# Patient Record
Sex: FEMALE | Race: BLACK OR AFRICAN AMERICAN | NOT HISPANIC OR LATINO | URBAN - METROPOLITAN AREA
[De-identification: names, ages, dates, MRNs, and addresses within clinical notes are randomized per-mention and may not be internally consistent; named-entity substitution may affect disease eponyms.]

---

## 2019-10-06 ENCOUNTER — OFFICE VISIT (OUTPATIENT)
Dept: URGENT CARE | Age: 19
End: 2019-10-06

## 2019-10-06 VITALS
DIASTOLIC BLOOD PRESSURE: 76 MMHG | WEIGHT: 144.29 LBS | RESPIRATION RATE: 18 BRPM | SYSTOLIC BLOOD PRESSURE: 113 MMHG | TEMPERATURE: 99.2 F | HEART RATE: 60 BPM | OXYGEN SATURATION: 95 %

## 2019-10-06 DIAGNOSIS — K08.9 DENTAL DISORDER: Primary | ICD-10-CM

## 2019-10-06 PROCEDURE — 99202 OFFICE O/P NEW SF 15 MIN: CPT | Performed by: PHYSICIAN ASSISTANT

## 2019-10-06 ASSESSMENT — ENCOUNTER SYMPTOMS: CONSTITUTIONAL NEGATIVE: 1

## 2022-08-29 ENCOUNTER — RX ONLY (RX ONLY)
Age: 22
End: 2022-08-29

## 2022-08-29 RX ORDER — TRETIONIN 0.25 MG/G
CREAM TOPICAL
Qty: 45 | Refills: 0 | Status: ERX | COMMUNITY
Start: 2022-08-29

## 2023-03-03 RX ORDER — SPIRONOLACTONE 50 MG/1
200 TABLET, FILM COATED ORAL DAILY
COMMUNITY

## 2023-03-11 ENCOUNTER — LAB ENCOUNTER (OUTPATIENT)
Dept: LAB | Age: 23
End: 2023-03-11
Attending: SURGERY
Payer: COMMERCIAL

## 2023-03-11 DIAGNOSIS — E05.10 GOITER, TOXIC, UNINODULAR: ICD-10-CM

## 2023-03-12 LAB — SARS-COV-2 RNA RESP QL NAA+PROBE: DETECTED

## 2023-05-23 ENCOUNTER — ANESTHESIA (OUTPATIENT)
Dept: SURGERY | Facility: HOSPITAL | Age: 23
End: 2023-05-23
Payer: COMMERCIAL

## 2023-05-23 ENCOUNTER — ANESTHESIA EVENT (OUTPATIENT)
Dept: SURGERY | Facility: HOSPITAL | Age: 23
End: 2023-05-23
Payer: COMMERCIAL

## 2023-05-23 ENCOUNTER — HOSPITAL ENCOUNTER (OUTPATIENT)
Facility: HOSPITAL | Age: 23
Setting detail: HOSPITAL OUTPATIENT SURGERY
Discharge: HOME | End: 2023-05-23
Attending: SURGERY | Admitting: SURGERY
Payer: COMMERCIAL

## 2023-05-23 VITALS
TEMPERATURE: 98 F | HEIGHT: 67 IN | RESPIRATION RATE: 16 BRPM | WEIGHT: 150 LBS | HEART RATE: 56 BPM | SYSTOLIC BLOOD PRESSURE: 112 MMHG | DIASTOLIC BLOOD PRESSURE: 75 MMHG | BODY MASS INDEX: 23.54 KG/M2 | OXYGEN SATURATION: 98 %

## 2023-05-23 DIAGNOSIS — E05.10 GOITER, TOXIC, UNINODULAR: Primary | ICD-10-CM

## 2023-05-23 DIAGNOSIS — E04.1 NON-TOXIC UNINODULAR GOITER: ICD-10-CM

## 2023-05-23 LAB — B-HCG UR QL: NEGATIVE

## 2023-05-23 PROCEDURE — 88307 TISSUE EXAM BY PATHOLOGIST: CPT | Performed by: SURGERY

## 2023-05-23 PROCEDURE — 88333 PATH CONSLTJ SURG CYTO XM 1: CPT | Performed by: SURGERY

## 2023-05-23 PROCEDURE — 81025 URINE PREGNANCY TEST: CPT

## 2023-05-23 PROCEDURE — 88331 PATH CONSLTJ SURG 1 BLK 1SPC: CPT | Performed by: SURGERY

## 2023-05-23 PROCEDURE — 0GTG0ZZ RESECTION OF LEFT THYROID GLAND LOBE, OPEN APPROACH: ICD-10-PCS | Performed by: SURGERY

## 2023-05-23 RX ORDER — PROCHLORPERAZINE EDISYLATE 5 MG/ML
5 INJECTION INTRAMUSCULAR; INTRAVENOUS EVERY 8 HOURS PRN
Status: DISCONTINUED | OUTPATIENT
Start: 2023-05-23 | End: 2023-05-23

## 2023-05-23 RX ORDER — SODIUM CHLORIDE, SODIUM LACTATE, POTASSIUM CHLORIDE, CALCIUM CHLORIDE 600; 310; 30; 20 MG/100ML; MG/100ML; MG/100ML; MG/100ML
INJECTION, SOLUTION INTRAVENOUS CONTINUOUS
Status: DISCONTINUED | OUTPATIENT
Start: 2023-05-23 | End: 2023-05-23

## 2023-05-23 RX ORDER — NEOSTIGMINE METHYLSULFATE 1 MG/ML
INJECTION, SOLUTION INTRAVENOUS AS NEEDED
Status: DISCONTINUED | OUTPATIENT
Start: 2023-05-23 | End: 2023-05-23 | Stop reason: SURG

## 2023-05-23 RX ORDER — HYDROMORPHONE HYDROCHLORIDE 1 MG/ML
0.6 INJECTION, SOLUTION INTRAMUSCULAR; INTRAVENOUS; SUBCUTANEOUS EVERY 5 MIN PRN
Status: DISCONTINUED | OUTPATIENT
Start: 2023-05-23 | End: 2023-05-23

## 2023-05-23 RX ORDER — MIDAZOLAM HYDROCHLORIDE 1 MG/ML
INJECTION INTRAMUSCULAR; INTRAVENOUS AS NEEDED
Status: DISCONTINUED | OUTPATIENT
Start: 2023-05-23 | End: 2023-05-23 | Stop reason: SURG

## 2023-05-23 RX ORDER — ONDANSETRON 2 MG/ML
INJECTION INTRAMUSCULAR; INTRAVENOUS AS NEEDED
Status: DISCONTINUED | OUTPATIENT
Start: 2023-05-23 | End: 2023-05-23 | Stop reason: SURG

## 2023-05-23 RX ORDER — PHENYLEPHRINE HCL 10 MG/ML
VIAL (ML) INJECTION AS NEEDED
Status: DISCONTINUED | OUTPATIENT
Start: 2023-05-23 | End: 2023-05-23 | Stop reason: SURG

## 2023-05-23 RX ORDER — NALOXONE HYDROCHLORIDE 0.4 MG/ML
80 INJECTION, SOLUTION INTRAMUSCULAR; INTRAVENOUS; SUBCUTANEOUS AS NEEDED
Status: DISCONTINUED | OUTPATIENT
Start: 2023-05-23 | End: 2023-05-23

## 2023-05-23 RX ORDER — ROCURONIUM BROMIDE 10 MG/ML
INJECTION, SOLUTION INTRAVENOUS AS NEEDED
Status: DISCONTINUED | OUTPATIENT
Start: 2023-05-23 | End: 2023-05-23 | Stop reason: SURG

## 2023-05-23 RX ORDER — HYDROMORPHONE HYDROCHLORIDE 1 MG/ML
0.4 INJECTION, SOLUTION INTRAMUSCULAR; INTRAVENOUS; SUBCUTANEOUS EVERY 5 MIN PRN
Status: DISCONTINUED | OUTPATIENT
Start: 2023-05-23 | End: 2023-05-23

## 2023-05-23 RX ORDER — LABETALOL HYDROCHLORIDE 5 MG/ML
5 INJECTION, SOLUTION INTRAVENOUS EVERY 5 MIN PRN
Status: DISCONTINUED | OUTPATIENT
Start: 2023-05-23 | End: 2023-05-23

## 2023-05-23 RX ORDER — TRIAMCINOLONE ACETONIDE 40 MG/ML
INJECTION, SUSPENSION INTRA-ARTICULAR; INTRAMUSCULAR AS NEEDED
Status: DISCONTINUED | OUTPATIENT
Start: 2023-05-23 | End: 2023-05-23 | Stop reason: HOSPADM

## 2023-05-23 RX ORDER — ACETAMINOPHEN 500 MG
1000 TABLET ORAL ONCE AS NEEDED
Status: COMPLETED | OUTPATIENT
Start: 2023-05-23 | End: 2023-05-23

## 2023-05-23 RX ORDER — SCOLOPAMINE TRANSDERMAL SYSTEM 1 MG/1
1 PATCH, EXTENDED RELEASE TRANSDERMAL ONCE
Status: DISCONTINUED | OUTPATIENT
Start: 2023-05-23 | End: 2023-05-23 | Stop reason: HOSPADM

## 2023-05-23 RX ORDER — DEXAMETHASONE SODIUM PHOSPHATE 4 MG/ML
VIAL (ML) INJECTION AS NEEDED
Status: DISCONTINUED | OUTPATIENT
Start: 2023-05-23 | End: 2023-05-23 | Stop reason: SURG

## 2023-05-23 RX ORDER — HYDROCODONE BITARTRATE AND ACETAMINOPHEN 5; 325 MG/1; MG/1
1 TABLET ORAL ONCE AS NEEDED
Status: COMPLETED | OUTPATIENT
Start: 2023-05-23 | End: 2023-05-23

## 2023-05-23 RX ORDER — BUPIVACAINE HYDROCHLORIDE 5 MG/ML
INJECTION, SOLUTION EPIDURAL; INTRACAUDAL AS NEEDED
Status: DISCONTINUED | OUTPATIENT
Start: 2023-05-23 | End: 2023-05-23 | Stop reason: HOSPADM

## 2023-05-23 RX ORDER — GLYCOPYRROLATE 0.2 MG/ML
INJECTION, SOLUTION INTRAMUSCULAR; INTRAVENOUS AS NEEDED
Status: DISCONTINUED | OUTPATIENT
Start: 2023-05-23 | End: 2023-05-23 | Stop reason: SURG

## 2023-05-23 RX ORDER — HYDROCODONE BITARTRATE AND ACETAMINOPHEN 5; 325 MG/1; MG/1
2 TABLET ORAL ONCE AS NEEDED
Status: COMPLETED | OUTPATIENT
Start: 2023-05-23 | End: 2023-05-23

## 2023-05-23 RX ORDER — MEPERIDINE HYDROCHLORIDE 25 MG/ML
12.5 INJECTION INTRAMUSCULAR; INTRAVENOUS; SUBCUTANEOUS AS NEEDED
Status: DISCONTINUED | OUTPATIENT
Start: 2023-05-23 | End: 2023-05-23

## 2023-05-23 RX ORDER — ONDANSETRON 2 MG/ML
4 INJECTION INTRAMUSCULAR; INTRAVENOUS EVERY 6 HOURS PRN
Status: DISCONTINUED | OUTPATIENT
Start: 2023-05-23 | End: 2023-05-23

## 2023-05-23 RX ORDER — HYDROMORPHONE HYDROCHLORIDE 1 MG/ML
0.2 INJECTION, SOLUTION INTRAMUSCULAR; INTRAVENOUS; SUBCUTANEOUS EVERY 5 MIN PRN
Status: DISCONTINUED | OUTPATIENT
Start: 2023-05-23 | End: 2023-05-23

## 2023-05-23 RX ORDER — ACETAMINOPHEN 500 MG
1000 TABLET ORAL ONCE
Status: DISCONTINUED | OUTPATIENT
Start: 2023-05-23 | End: 2023-05-23 | Stop reason: HOSPADM

## 2023-05-23 RX ORDER — METOCLOPRAMIDE HYDROCHLORIDE 5 MG/ML
INJECTION INTRAMUSCULAR; INTRAVENOUS AS NEEDED
Status: DISCONTINUED | OUTPATIENT
Start: 2023-05-23 | End: 2023-05-23 | Stop reason: SURG

## 2023-05-23 RX ORDER — IBUPROFEN 800 MG/1
800 TABLET ORAL EVERY 8 HOURS PRN
Qty: 20 TABLET | Refills: 1 | Status: SHIPPED | OUTPATIENT
Start: 2023-05-23 | End: 2023-07-22

## 2023-05-23 RX ADMIN — ROCURONIUM BROMIDE 50 MG: 10 INJECTION, SOLUTION INTRAVENOUS at 10:55:00

## 2023-05-23 RX ADMIN — ONDANSETRON 4 MG: 2 INJECTION INTRAMUSCULAR; INTRAVENOUS at 10:55:00

## 2023-05-23 RX ADMIN — GLYCOPYRROLATE 0.2 MG: 0.2 INJECTION, SOLUTION INTRAMUSCULAR; INTRAVENOUS at 10:55:00

## 2023-05-23 RX ADMIN — PHENYLEPHRINE HCL 100 MCG: 10 MG/ML VIAL (ML) INJECTION at 11:23:00

## 2023-05-23 RX ADMIN — METOCLOPRAMIDE HYDROCHLORIDE 10 MG: 5 INJECTION INTRAMUSCULAR; INTRAVENOUS at 10:55:00

## 2023-05-23 RX ADMIN — NEOSTIGMINE METHYLSULFATE 5 MG: 1 INJECTION, SOLUTION INTRAVENOUS at 11:52:00

## 2023-05-23 RX ADMIN — MIDAZOLAM HYDROCHLORIDE 2 MG: 1 INJECTION INTRAMUSCULAR; INTRAVENOUS at 10:52:00

## 2023-05-23 RX ADMIN — SODIUM CHLORIDE, SODIUM LACTATE, POTASSIUM CHLORIDE, CALCIUM CHLORIDE: 600; 310; 30; 20 INJECTION, SOLUTION INTRAVENOUS at 10:49:00

## 2023-05-23 RX ADMIN — PHENYLEPHRINE HCL 100 MCG: 10 MG/ML VIAL (ML) INJECTION at 11:05:00

## 2023-05-23 RX ADMIN — PHENYLEPHRINE HCL 100 MCG: 10 MG/ML VIAL (ML) INJECTION at 11:35:00

## 2023-05-23 RX ADMIN — DEXAMETHASONE SODIUM PHOSPHATE 4 MG: 4 MG/ML VIAL (ML) INJECTION at 10:55:00

## 2023-05-23 RX ADMIN — PHENYLEPHRINE HCL 100 MCG: 10 MG/ML VIAL (ML) INJECTION at 11:53:00

## 2023-05-23 RX ADMIN — GLYCOPYRROLATE 0.8 MG: 0.2 INJECTION, SOLUTION INTRAMUSCULAR; INTRAVENOUS at 11:52:00

## 2023-05-23 NOTE — ANESTHESIA POSTPROCEDURE EVALUATION
[facility] Patient Status:  Hospital Outpatient Surgery   Age/Gender 24year old female MRN [id number]   [facility] SURGERY Attending Ade Erickson MD   Hosp Day # 0 PCP None Pcp       Anesthesia Post-op Note    LEFT THYROID LOBECTOMY WITH INTRAOPERATIVE FROZEN SECTION, NERVE MONITORING    Procedure Summary     Date: 05/23/23 Room / Location: [facility]4 [facility] OR 03 / 1404 [facility] OR    Anesthesia Start: 3859 Anesthesia Stop: 1209    Procedure: LEFT THYROID LOBECTOMY WITH INTRAOPERATIVE FROZEN SECTION, NERVE MONITORING (Left: Neck) Diagnosis:       Non-toxic uninodular goiter      (Non-toxic uninodular goiter [[id number]])    Surgeons: Ade Erickson MD Anesthesiologist: Salty Dunn MD    Anesthesia Type: general ASA Status: 2          Anesthesia Type: general    Vitals Value Taken Time   /67 05/23/23 1210   Temp 97.3 05/23/23 1210   Pulse 112 05/23/23 1210   Resp 18 05/23/23 1210   SpO2 98 05/23/23 1210       Patient Location: PACU    Anesthesia Type: general    Airway Patency: patent and extubated    Postop Pain Control: adequate    Mental Status: mildly sedated but able to meaningfully participate in the post-anesthesia evaluation    Nausea/Vomiting: none    Cardiopulmonary/Hydration status: stable euvolemic    Complications: no apparent anesthesia related complications    Postop vital signs: stable    Dental Exam: Unchanged from Preop    Patient to be discharged from PACU when criteria met.

## 2023-05-23 NOTE — OPERATIVE REPORT
[facility]    PATIENT'S NAME: Joan Cerda   ATTENDING PHYSICIAN: Scar Ryan M.D. OPERATING PHYSICIAN: Scar Ryan M.D. PATIENT ACCOUNT#:   [de-identified]    LOCATION:  [place] [address]  MEDICAL RECORD #:   [id number]       YOB: 2000  ADMISSION DATE:       05/23/2023      OPERATION DATE:  05/23/2023    OPERATIVE REPORT      PREOPERATIVE DIAGNOSIS:  Left thyroid goiter. POSTOPERATIVE DIAGNOSIS:  Left thyroid goiter. PROCEDURE:  Left thyroid lobectomy with intraoperative frozen section and unilateral neuromonitoring. ASSISTANT:  JANIE Jordan. ESTIMATED BLOOD LOSS:  5 mL. SPECIMEN:  Left thyroid gland. DISPOSITION:  To PACU. COMPLICATIONS:  None. INDICATIONS:  Patient is a 23-year-old female who presented to the office for an evaluation of a left thyroid goiter. Patient had irregular period and her TSH was abnormal.  She was seen by Dr. João Farfan, her endocrinologist, and had an ultrasound and uptake scan that showed a hyperfunctioning left thyroid goiter. She noted some tiredness and hair loss. The patient was not on any thyroid medication and she had a palpable goiter on the left side of the neck. Ultrasound showed a 4.7 cm mass and uptake scan showed a hyperfunctioning nodule on the left side. Her last thyroid function test was still normal.  So based on this, discussed with the patient undergoing a left thyroid lobectomy and with frozen section and with neuromonitoring. If there is evidence of a papillary carcinoma, would proceed with a total thyroidectomy. The risks and benefits of surgery were discussed, and patient agreed for the procedure, signed the informed consent. OPERATIVE TECHNIQUE:  Patient was brought to the operating room and was placed in supine position on the operating table. Lower extremity SCD boots were placed after IV sedation and neuromonitoring endotracheal tube placement, both arms tucked at side.   A roll was placed shoulder blade, neck was slightly hyperextended. Then, the area was prepped into a sterile field. Lower Kocher neck incision was made with a 15 blade. Electric Bovie cautery used to separate the subcutaneous tissue, and superior and inferior flaps raised. Deep cervical fascia was identified, midline incision was made and the left strap muscle was carefully dissected from the thyroid capsule. Using the Harmonic scalpel, the superior and inferior poles divided as the well as middle thyroid vein. As the gland was medially rotated, both parathyroid glands were identified and preserved, and then the nerve and artery identified. The nerve was attached to the thyroid capsule which was carefully dissected and released, and then the artery was divided using the Harmonic scalpel. The nerve was stimulated before and after the removal of the goiter, and the goiter was transected right at the isthmus. Removed gland was sent down to Pathology for evaluation, and meantime, all the blood vessels that were divided using the Harmonic scalpel were reinforced using a 2-0, 3-0 silk ties. After obtaining good hemostasis, a piece of Surgicel laid and a 3-0 Vicryl was used to close the deep cervical and dermal layer after the final pathology showed a follicular lesion without any obvious evidence of a papillary carcinoma. Then, 1 mL of Kenalog was injected in the dermal layer for the keloid. Then, the 4-0 Monocryl was used to close the incision on the skin, placing a subcuticular stitch. The incision was then covered with Steri-Strips, 4 x 4, and tape. The patient tolerated the procedure well, was extubated in the operating room and transferred to PACU in stable condition. At the end the case, needle, instrument and sponge counts were all correct.   The surgical assistant was medically necessary for the entire procedure to position the patient, prep the area, drape, and then retract the wound using instrument to remove the goiter and assist in closing and transferring patient out of the operating room.     Dictated By Scar Ryan M.D.  d: 05/23/2023 12:12:03  t: 05/23/2023 14:34:47  Job [id number]  TL/

## 2023-05-23 NOTE — BRIEF OP NOTE
Pre-Operative Diagnosis: Left Non-toxic uninodular goiter [[id number]]     Post-Operative Diagnosis: Left Non-toxic uninodular goiter [[id number]]      Procedure Performed:   LEFT THYROID LOBECTOMY WITH INTRAOPERATIVE FROZEN SECTION, NERVE MONITORING    Surgeon(s) and Role:     Marietta Osgood, MD - Primary    Assistant(s):  Surgical Assistant.: JANIE Aguilar     Surgical Findings: follicular lesion     Specimen: left thyroid gland     Estimated Blood Loss: Blood Output: 5 mL (5/23/2023 11:47 AM)    Dictation Number:  Dionne Baird MD  5/23/2023  12:03 PM

## 2023-05-23 NOTE — ANESTHESIA PROCEDURE NOTES
Airway  Date/Time: 5/23/2023 10:58 AM  Urgency: elective    Airway not difficult    General Information and Staff    Patient location during procedure: OR  Anesthesiologist: Angel Mirza MD  Performed: anesthesiologist   Performed by: Angel Mirza MD  Authorized by: Angel Mirza MD      Indications and Patient Condition  Indications for airway management: anesthesia  Spontaneous Ventilation: absent  Sedation level: deep  Preoxygenated: yes  Patient position: sniffing  Mask difficulty assessment: 2 - vent by mask + OA or adjuvant +/- NMBA    Final Airway Details  Final airway type: endotracheal airway      Successful airway: NIM tube  Cuffed: yes   Successful intubation technique: direct laryngoscopy  Facilitating devices/methods: intubating stylet  Endotracheal tube insertion site: oral  Blade: Karen  Blade size: #4  NIM tube Size: 7.0  Cormack-Lehane Classification: grade I - full view of glottis  Placement verified by: chest auscultation and capnometry   Measured from: lips  Number of attempts at approach: 1  Ventilation between attempts: none  Number of other approaches attempted: 0    Additional Comments  PreO2. IV induction as noted. Eyes taped. Easy mask ventilation. DL x 1 with MAC 4, grade 1 view, atraumatic oral intubation NIM ETT 7.0, +ETCO2, +BBS. Taped and secured.

## (undated) DEVICE — SOL NACL IRRIG 0.9% 1000ML BTL

## (undated) DEVICE — AIRWAY ENDO  TRIVANTAGE 7MM

## (undated) DEVICE — SLEEVE KENDALL SCD EXPRESS MED

## (undated) DEVICE — MEGADYNE ELECTRODE ADULT PT RT

## (undated) DEVICE — STERILE SYNTHETIC POLYISOPRENE POWDER-FREE SURGICAL GLOVES WITH HYDROGEL COATING: Brand: PROTEXIS

## (undated) DEVICE — SUT SILK 3-0 A304H

## (undated) DEVICE — SUT SILK 2-0 A305H

## (undated) DEVICE — PROBE 8225101 5PK STD PRASS FL TIP ROHS

## (undated) DEVICE — HARMONIC 1100 SHEARS, 20CM SHAFT LENGTH: Brand: HARMONIC

## (undated) DEVICE — SUT VICRYL 3-0 SH J416H

## (undated) DEVICE — THYROID: Brand: MEDLINE INDUSTRIES, INC.

## (undated) DEVICE — ABSORBABLE HEMOSTAT (OXIDIZED REGENERATED CELLULOSE, U.S.P.): Brand: SURGICEL

## (undated) DEVICE — SYRINGE WITH HYPODERMIC SAFETY NEEDLE: Brand: MAGELLAN

## (undated) DEVICE — 3M™ STERI-STRIP™ REINFORCED ADHESIVE SKIN CLOSURES, R1547, 1/2 IN X 4 IN (12 MM X 100 MM), 6 STRIPS/ENVELOPE: Brand: 3M™ STERI-STRIP™

## (undated) DEVICE — SUT MONOCRYL 4-0 PS-2 Y496G